# Patient Record
Sex: MALE | Race: WHITE | Employment: FULL TIME | URBAN - NONMETROPOLITAN AREA
[De-identification: names, ages, dates, MRNs, and addresses within clinical notes are randomized per-mention and may not be internally consistent; named-entity substitution may affect disease eponyms.]

---

## 2021-02-19 ENCOUNTER — OFFICE VISIT (OUTPATIENT)
Dept: FAMILY MEDICINE CLINIC | Age: 31
End: 2021-02-19
Payer: COMMERCIAL

## 2021-02-19 VITALS
BODY MASS INDEX: 34.93 KG/M2 | TEMPERATURE: 97.3 F | DIASTOLIC BLOOD PRESSURE: 64 MMHG | SYSTOLIC BLOOD PRESSURE: 128 MMHG | OXYGEN SATURATION: 99 % | WEIGHT: 244 LBS | HEIGHT: 70 IN | HEART RATE: 86 BPM

## 2021-02-19 DIAGNOSIS — Z00.00 WELL ADULT EXAM: Primary | ICD-10-CM

## 2021-02-19 DIAGNOSIS — Z00.00 WELL ADULT EXAM: ICD-10-CM

## 2021-02-19 LAB
BASOPHILS ABSOLUTE: 0.03 E9/L (ref 0–0.2)
BASOPHILS RELATIVE PERCENT: 0.3 % (ref 0–2)
EOSINOPHILS ABSOLUTE: 0.08 E9/L (ref 0.05–0.5)
EOSINOPHILS RELATIVE PERCENT: 0.9 % (ref 0–6)
HCT VFR BLD CALC: 45.5 % (ref 37–54)
HEMOGLOBIN: 14.9 G/DL (ref 12.5–16.5)
IMMATURE GRANULOCYTES #: 0.02 E9/L
IMMATURE GRANULOCYTES %: 0.2 % (ref 0–5)
LYMPHOCYTES ABSOLUTE: 2.32 E9/L (ref 1.5–4)
LYMPHOCYTES RELATIVE PERCENT: 26.4 % (ref 20–42)
MCH RBC QN AUTO: 30.5 PG (ref 26–35)
MCHC RBC AUTO-ENTMCNC: 32.7 % (ref 32–34.5)
MCV RBC AUTO: 93 FL (ref 80–99.9)
MONOCYTES ABSOLUTE: 0.9 E9/L (ref 0.1–0.95)
MONOCYTES RELATIVE PERCENT: 10.2 % (ref 2–12)
NEUTROPHILS ABSOLUTE: 5.45 E9/L (ref 1.8–7.3)
NEUTROPHILS RELATIVE PERCENT: 62 % (ref 43–80)
PDW BLD-RTO: 12.7 FL (ref 11.5–15)
PLATELET # BLD: 293 E9/L (ref 130–450)
PMV BLD AUTO: 10.8 FL (ref 7–12)
RBC # BLD: 4.89 E12/L (ref 3.8–5.8)
WBC # BLD: 8.8 E9/L (ref 4.5–11.5)

## 2021-02-19 PROCEDURE — 99385 PREV VISIT NEW AGE 18-39: CPT | Performed by: FAMILY MEDICINE

## 2021-02-19 SDOH — HEALTH STABILITY: MENTAL HEALTH: HOW OFTEN DO YOU HAVE A DRINK CONTAINING ALCOHOL?: NOT ASKED

## 2021-02-19 ASSESSMENT — PATIENT HEALTH QUESTIONNAIRE - PHQ9
1. LITTLE INTEREST OR PLEASURE IN DOING THINGS: 0
SUM OF ALL RESPONSES TO PHQ QUESTIONS 1-9: 0
SUM OF ALL RESPONSES TO PHQ9 QUESTIONS 1 & 2: 0
2. FEELING DOWN, DEPRESSED OR HOPELESS: 0

## 2021-02-19 NOTE — PROGRESS NOTES
[facility]  Office Progress Note - Dr. Bjorn Biggs  2/19/21    CC:   Chief Complaint   Patient presents with    New Patient    Epistaxis     bloody noses off and on most of life. HPI: establish    No recent PCP    Has had sinus problems and bloody noses consistently throughout his life. Was worse past January with one a day but hasnt been as frequenyt recently. Exam he has some prominent vessels in the right naris, but nothing actively bleeding, nor any dried blood. Maybe worried about sleep apnea. Snores regularly. Wife thinks that maybe a mild apnea type of event. Sometimes has woken choking. Gained some weight, notes he could lose 50 pounds. Wants to work on decreasing sugar. [place] 11  Doesn't want to do a sleep study. Obesity  BMI 35  Has gained probably 50 pounds over time. Never lean in school, but lifting weights a lot. Had a sedentary job for a while and gained some weight. He is motivated to work on this, but worries about dedication. He thinks that trying to improve likely sleep apnea with weight loss is his preferred effort in doing so. Discussed dietary restriction. _________________________________________________________    Assessment / Delene Call was seen today for new patient and epistaxis. Diagnoses and all orders for this visit:    Well adult exam  -     CBC Auto Differential; Future  -     Comprehensive Metabolic Panel; Future  -     Lipid Panel; Future    Overall Lucas Ramirez is doing well. Age appropriate HM topics reviewed and updated where agreeable. Vaccines reviewed and updated where agreeable. Age appropriate anticipatory guidance discussed. Encouraged to work on [employer] and exercise strategies. Opportunity to ask questions and answers provided as able. Spent some time on dietary counseling today and discussed Smart goals for setting and exercise regimen. Encouraged him to work on weight loss. He will follow-up as needed. Discussed that he may have sleep apnea, but this does not sound particularly bothersome. Discussed long-term consequences of untreated sleep apnea. At age 27 without any red flag symptoms, I think he can continue to work on weight loss as a cure for this problem. If he has increased fatigue during the day or develops any new or unexplained symptoms, he will follow-up. [place] score 11. Recommend RTO in 1 year for annual physical.       Return in about 1 year (around 2/19/2022). or as scheduled. Patient counseled to follow up sooner or seek more acute care if symptoms worsening or not improving according to plan. Electronically signed by Catherin Lefort, MD on 2/19/2021    _________________________________________________________  No current outpatient medications on file prior to visit. No current facility-administered medications on file prior to visit. There is no problem list on file for this patient.    _________________________________________________________  Past Medical History:   Diagnosis Date    Depression     Was on antidepression meds at 12        Family History   Problem Relation Age of Onset    Depression Mother     Cancer Father         Lung    High Blood Pressure Maternal Grandfather     Heart Attack Maternal Grandfather     Other Maternal Grandmother         Brain aneurysm    Cancer Paternal Grandfather        History reviewed. No pertinent surgical history. 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 Went to [employer] in [place]. Started some woodworking recently. 2 children - 4 and 2 yo. Chart reviewed and updated where appropriate for PMH, Fam, and Soc Hx. _________________________________________________________  ROS: POSITIVE: As in the HPI. Otherwise Pertinent negatives are negative.    __________________________________________________________  Physical Exam   /64 (Site: Left Upper Arm, Position: Sitting, Cuff Size: Large Adult)   Pulse 86   Temp 97.3 °F (36.3 °C) (Temporal)   Ht 5' 10\" (1.778 m)   Wt 244 lb (110.7 kg)   SpO2 99%   BMI 35.01 kg/m²   Wt Readings from Last 3 Encounters:   02/19/21 244 lb (110.7 kg)   06/19/16 230 lb (104.3 kg)       Constitutional:    He is oriented to person, place, and time. He appears well-developed and well-nourished. HENT:    Right Ear: normal pinna, normal canal, normal TM. Left Ear: normal pinna, normal canal, normal TM. Nose: Nose normal.  Prominent vessels in the right naris   Mouth/Throat: Oropharynx is clear and moist.  Mildly crowded oropharynx  Eyes:    Conjunctivae are normal.    Pupils are equal, round, and reactive to light. EOMI. Neck:    Normal range of motion. No thyromegaly or nodules noted. No bruit. Cardiovascular:    Normal rate, regular rhythm and normal heart sounds. No murmur. No gallop and no friction rub. Pulmonary/Chest:    Effort normal and breath sounds normal.    No wheezes. No rales or rhonchi. Abdominal:    Soft. Bowel sounds are normal.    No distension. No tenderness. Musculoskeletal:    Normal range of motion. No joint swelling noted. No peripheral edema. Skin:    Skin is warm and dry. No rashes, lesions. Psychiatric:    He has a normal mood and affect. Normal groom and dress. No SI or HI.   ________________________________________________________    This note may have been created using dictation software.  Efforts were made to reduce grammatical or syntax errors, but some may persist.

## 2021-02-20 LAB
ALBUMIN SERPL-MCNC: 4.7 G/DL (ref 3.5–5.2)
ALP BLD-CCNC: 83 U/L (ref 40–129)
ALT SERPL-CCNC: 24 U/L (ref 0–40)
ANION GAP SERPL CALCULATED.3IONS-SCNC: 15 MMOL/L (ref 7–16)
AST SERPL-CCNC: 21 U/L (ref 0–39)
BILIRUB SERPL-MCNC: 0.4 MG/DL (ref 0–1.2)
BUN BLDV-MCNC: 12 MG/DL (ref 6–20)
CALCIUM SERPL-MCNC: 9.8 MG/DL (ref 8.6–10.2)
CHLORIDE BLD-SCNC: 106 MMOL/L (ref 98–107)
CHOLESTEROL, TOTAL: 134 MG/DL (ref 0–199)
CO2: 22 MMOL/L (ref 22–29)
CREAT SERPL-MCNC: 1 MG/DL (ref 0.7–1.2)
GFR AFRICAN AMERICAN: >60
GFR NON-AFRICAN AMERICAN: >60 ML/MIN/1.73
GLUCOSE BLD-MCNC: 94 MG/DL (ref 74–99)
HDLC SERPL-MCNC: 48 MG/DL
LDL CHOLESTEROL CALCULATED: 65 MG/DL (ref 0–99)
POTASSIUM SERPL-SCNC: 4.6 MMOL/L (ref 3.5–5)
SODIUM BLD-SCNC: 143 MMOL/L (ref 132–146)
TOTAL PROTEIN: 7.6 G/DL (ref 6.4–8.3)
TRIGL SERPL-MCNC: 107 MG/DL (ref 0–149)
VLDLC SERPL CALC-MCNC: 21 MG/DL

## 2022-02-22 ENCOUNTER — TELEPHONE (OUTPATIENT)
Dept: FAMILY MEDICINE CLINIC | Age: 32
End: 2022-02-22

## 2022-02-22 ENCOUNTER — OFFICE VISIT (OUTPATIENT)
Dept: FAMILY MEDICINE CLINIC | Age: 32
End: 2022-02-22
Payer: COMMERCIAL

## 2022-02-22 VITALS
DIASTOLIC BLOOD PRESSURE: 72 MMHG | WEIGHT: 231.2 LBS | OXYGEN SATURATION: 98 % | TEMPERATURE: 98.1 F | HEART RATE: 79 BPM | HEIGHT: 70 IN | BODY MASS INDEX: 33.1 KG/M2 | SYSTOLIC BLOOD PRESSURE: 130 MMHG | RESPIRATION RATE: 16 BRPM

## 2022-02-22 DIAGNOSIS — Z82.49 FAMILY HISTORY OF BRAIN ANEURYSM: ICD-10-CM

## 2022-02-22 DIAGNOSIS — Z00.00 ENCOUNTER FOR WELL ADULT EXAM WITHOUT ABNORMAL FINDINGS: Primary | ICD-10-CM

## 2022-02-22 PROCEDURE — 99395 PREV VISIT EST AGE 18-39: CPT | Performed by: FAMILY MEDICINE

## 2022-02-22 SDOH — ECONOMIC STABILITY: FOOD INSECURITY: WITHIN THE PAST 12 MONTHS, YOU WORRIED THAT YOUR FOOD WOULD RUN OUT BEFORE YOU GOT MONEY TO BUY MORE.: NEVER TRUE

## 2022-02-22 SDOH — ECONOMIC STABILITY: FOOD INSECURITY: WITHIN THE PAST 12 MONTHS, THE FOOD YOU BOUGHT JUST DIDN'T LAST AND YOU DIDN'T HAVE MONEY TO GET MORE.: NEVER TRUE

## 2022-02-22 ASSESSMENT — PATIENT HEALTH QUESTIONNAIRE - PHQ9
SUM OF ALL RESPONSES TO PHQ9 QUESTIONS 1 & 2: 0
SUM OF ALL RESPONSES TO PHQ QUESTIONS 1-9: 0
SUM OF ALL RESPONSES TO PHQ QUESTIONS 1-9: 0
1. LITTLE INTEREST OR PLEASURE IN DOING THINGS: 0
SUM OF ALL RESPONSES TO PHQ QUESTIONS 1-9: 0
SUM OF ALL RESPONSES TO PHQ QUESTIONS 1-9: 0
2. FEELING DOWN, DEPRESSED OR HOPELESS: 0

## 2022-02-22 ASSESSMENT — SOCIAL DETERMINANTS OF HEALTH (SDOH): HOW HARD IS IT FOR YOU TO PAY FOR THE VERY BASICS LIKE FOOD, HOUSING, MEDICAL CARE, AND HEATING?: NOT VERY HARD

## 2022-02-22 NOTE — PROGRESS NOTES
[facility]  Office Progress Note - Dr. Ora Ferrera  22    CC:   Chief Complaint   Patient presents with    Annual Exam        /72   Pulse 79   Temp 98.1 °F (36.7 °C) (Temporal)   Resp 16   Ht 5' 10\" (1.778 m)   Wt 231 lb 3.2 oz (104.9 kg)   SpO2 98%   BMI 33.17 kg/m²   Wt Readings from Last 3 Encounters:   22 231 lb 3.2 oz (104.9 kg)   21 244 lb (110.7 kg)   16 230 lb (104.3 kg)       HPI:   Patient presents for yearly physical.     Overall they are doing well. Concerns: mother with brain aneurysm recently. Coiled. Notes that her mother also had a brain aneurysm and  of this in her late 45s. He has rare headaches, mild, maybe a few times a month. A couple episodes of migraine in his lifetime. Excedrin migraine generally helps. No vision changes. Reviewing recommendations on screening people with one first-degree relative, the authors do not recommend screening in this population as the benefits do not appear to outweigh significant risks and about 150 people need screenings to prevent 1 subarachnoid hemorrhage in a lifetime. \"My mental tracy isnt where I want it to be\"  Dx with depression as a teenager. Was on medicine for a while stopped it and didn't notice anything different  Added stress recently from moms surgery and also bought a house last summer. [place] fuse. He had an episode around [place] time where he got really frustrated while in the car with his family. His kids were screaming and throwing a tantrum. Ultimately he felt like he had to walk home from the store and could not stay in the car any longer. Reflecting on this after heightened emotions past, he was pretty embarrassed and embarrassed for his wife as well. He also does not want his kids to remember this when they are older (they're pretty little now).   He does not really want to start a medication and he does not feel that irritability is affecting his work or many of his relationships. Discussed medication versus counseling versus being self reflective in those moments. Weight reviewed. Body mass index is 33.17 kg/m². Wt Readings from Last 3 Encounters:   02/22/22 231 lb 3.2 oz (104.9 kg)   02/19/21 244 lb (110.7 kg)   06/19/16 230 lb (104.3 kg)   hasnt beenworking on it strongly. Notes snacking has decreased - not as hungry as he used to be. Patient is  currently working on diet. Patient is not currently working on exercise. Job is strenuous. Vaccines reviewed. Discussed age appropriate vaccine recommendations. HM Reviewed. Discussed age appropriate HM topics. Patient was encouraged to update HM topics. Ordered where agreeable. Reviewed age appropriate anticipatory guidance. Recent labs reviewed include: Last year's labs were perfect. The ASCVD Risk score (Al Handley., et al., 2013) failed to calculate for the following reasons: The 2013 ASCVD risk score is only valid for ages 36 to 78    PMH, FH, SxHx, Social Hx reviewed and updated if needed. _________________________________________________________    Assessment / Mena Solano was seen today for annual exam.    Diagnoses and all orders for this visit:    Encounter for well adult exam without abnormal findings  Overall Isai Sy is doing well. Age appropriate HM topics reviewed and updated where agreeable. Vaccines reviewed and updated where agreeable. Age appropriate anticipatory guidance discussed. Encouraged to work on [employer] and exercise strategies. Opportunity to ask questions and answers provided as able. Mental health was discussed today. Ultimately he feels that this is occasionally bothersome but not regularly bothersome enough to warrant him taking the medication at this time. I tend to agree. I encouraged a self reflective process to try to more regularly identify times when he is becoming irritable and start to change that behavior.   I also offered him counseling as an option, but he is not interested at this time. He will let me know if he decides to try medicine in the future. Recommend RTO in 1 year for annual physical.     Family history of brain aneurysm  Reviewing up-to-date recommendations, I do not recommend screening for aneurysm at this time especially given asymptomatic nature in this patient. 149 first-degree relatives would need screened to prevent 1 subarachnoid hemorrhage in their lifetime nearly 300 first-degree relatives would need to be screened to prevent 1 fatal subarachnoid hemorrhage in their lifetime. Patient was counseled to let me know if he develops any change in headache pattern. Return in about 1 year (around 2/22/2023). or as scheduled. Patient counseled to follow up sooner or seek more acute care if symptoms worsening or not improving according to plan. Electronically signed by Fatmata Fontana MD on 2/22/2022    _________________________________________________________  No current outpatient medications on file prior to visit. No current facility-administered medications on file prior to visit. Patient Active Problem List   Diagnosis Code    Family history of brain aneurysm Z82.49     _________________________________________________________  Past Medical History:   Diagnosis Date    Depression     Was on antidepression meds at 12        Family History   Problem Relation Age of Onset    Depression Mother     Cerebral Aneurysm Mother     Cancer Father         Lung    Other Maternal Grandmother         Brain aneurysm    High Blood Pressure Maternal Grandfather     Heart Attack Maternal Grandfather     Cancer Paternal Grandfather        No past surgical history on file. Social History     Tobacco Use    Smoking status: Never Smoker    Smokeless tobacco: Never Used   Substance Use Topics    Alcohol use:  Yes    Drug use: Never       Chart reviewed and updated where appropriate for PMH, Fam, and Soc Hx.  _________________________________________________________  ROS: POSITIVE: As in the HPI. Otherwise Pertinent negatives are negative.    __________________________________________________________  Physical Exam   Constitutional:    He is oriented to person, place, and time. He appears well-developed and well-nourished. HENT:    Right Ear: normal pinna, normal canal, normal TM. Left Ear: normal pinna, normal canal, normal TM. Nose: Nose normal.    Mouth/Throat: Oropharynx is clear and moist.   Eyes:    Conjunctivae are normal.    Pupils are equal, round, and reactive to light. EOMI. Neck:    Normal range of motion. No thyromegaly or nodules noted. No bruit. No LAD. Cardiovascular:    Normal rate, regular rhythm and normal heart sounds. No murmur. No gallop and no friction rub. Pulmonary/Chest:    Effort normal and breath sounds normal.    No wheezes. No rales or rhonchi. Abdominal:    Soft. Bowel sounds are normal.    No distension. No tenderness. Musculoskeletal:    Normal range of motion. No joint swelling noted. No peripheral edema. Neurological:    He is A&Ox3. Motor and sensation grossly intact. Normal Gait. Skin:    Skin is warm and dry. No rashes, lesions. Psychiatric:    He has a normal mood and affect. Normal groom and dress. No SI or HI.   ________________________________________________________    This note may have been created using dictation software.  Efforts were made to reduce errors, but some may persist.

## 2022-02-22 NOTE — TELEPHONE ENCOUNTER
Please advise patient that if he does not want to screen for a brain aneurysm, that is reasonable. With just one first-degree relative with a brain aneurysm (his mother), he has about a 4% chance of having one himself. Not all aneurysms burst/bleed. He would have about a 0.6% chance of having a brain bleed in his lifetime. Not all brain bleeds kill you. He would have about a 0.3% chance of having a deadly brain bleed in his lifetime. So ultimately the risks are low. If he wants to screen to be sure, that is still something we could pursue.

## 2022-02-25 NOTE — TELEPHONE ENCOUNTER
Left VM advising pt that I was going to send Doctor's message to him via [employer]. Advsied him to call us if he is unable to get into his mychart to review the message.

## 2024-11-28 ASSESSMENT — PATIENT HEALTH QUESTIONNAIRE - PHQ9
1. LITTLE INTEREST OR PLEASURE IN DOING THINGS: NOT AT ALL
1. LITTLE INTEREST OR PLEASURE IN DOING THINGS: NOT AT ALL
SUM OF ALL RESPONSES TO PHQ9 QUESTIONS 1 & 2: 0
SUM OF ALL RESPONSES TO PHQ QUESTIONS 1-9: 0
2. FEELING DOWN, DEPRESSED OR HOPELESS: NOT AT ALL
SUM OF ALL RESPONSES TO PHQ QUESTIONS 1-9: 0
SUM OF ALL RESPONSES TO PHQ QUESTIONS 1-9: 0
2. FEELING DOWN, DEPRESSED OR HOPELESS: NOT AT ALL
SUM OF ALL RESPONSES TO PHQ QUESTIONS 1-9: 0
SUM OF ALL RESPONSES TO PHQ9 QUESTIONS 1 & 2: 0

## 2024-11-29 ENCOUNTER — OFFICE VISIT (OUTPATIENT)
Dept: FAMILY MEDICINE CLINIC | Age: 34
End: 2024-11-29
Payer: COMMERCIAL

## 2024-11-29 VITALS
DIASTOLIC BLOOD PRESSURE: 86 MMHG | RESPIRATION RATE: 16 BRPM | WEIGHT: 246 LBS | HEART RATE: 88 BPM | SYSTOLIC BLOOD PRESSURE: 128 MMHG | HEIGHT: 70 IN | OXYGEN SATURATION: 98 % | BODY MASS INDEX: 35.22 KG/M2 | TEMPERATURE: 98.8 F

## 2024-11-29 DIAGNOSIS — Z00.00 ENCOUNTER FOR WELL ADULT EXAM WITHOUT ABNORMAL FINDINGS: Primary | ICD-10-CM

## 2024-11-29 DIAGNOSIS — Z82.49 FAMILY HISTORY OF BRAIN ANEURYSM: ICD-10-CM

## 2024-11-29 DIAGNOSIS — Z00.00 ENCOUNTER FOR WELL ADULT EXAM WITHOUT ABNORMAL FINDINGS: ICD-10-CM

## 2024-11-29 PROCEDURE — 99395 PREV VISIT EST AGE 18-39: CPT | Performed by: FAMILY MEDICINE

## 2024-11-29 SDOH — ECONOMIC STABILITY: FOOD INSECURITY: WITHIN THE PAST 12 MONTHS, YOU WORRIED THAT YOUR FOOD WOULD RUN OUT BEFORE YOU GOT MONEY TO BUY MORE.: NEVER TRUE

## 2024-11-29 SDOH — ECONOMIC STABILITY: FOOD INSECURITY: WITHIN THE PAST 12 MONTHS, THE FOOD YOU BOUGHT JUST DIDN'T LAST AND YOU DIDN'T HAVE MONEY TO GET MORE.: NEVER TRUE

## 2024-11-29 SDOH — ECONOMIC STABILITY: INCOME INSECURITY: HOW HARD IS IT FOR YOU TO PAY FOR THE VERY BASICS LIKE FOOD, HOUSING, MEDICAL CARE, AND HEATING?: NOT HARD AT ALL

## 2024-11-29 NOTE — PROGRESS NOTES
[facility]  [facility] Note - Dr. Skelton  11/29/24    CC:   Chief Complaint   Patient presents with    Annual Exam        /86   Pulse 88   Temp 98.8 °F (37.1 °C) (Temporal)   Resp 16   Ht 1.778 m (5' 10\")   Wt 111.6 kg (246 lb)   SpO2 98%   BMI 35.30 kg/m²   Wt Readings from Last 3 Encounters:   11/29/24 111.6 kg (246 lb)   02/22/22 104.9 kg (231 lb 3.2 oz)   02/19/21 110.7 kg (244 lb)       HPI: Patient presents for yearly physical.     Overall they are doing well.    Concerns: Feeling well.   Just a check up.     Headaches have improved.  Pretty rare now.  He thinks maybe due to lack of sleep before, which has now changed for the better. Usually in the neck when he gets one.       Weight reviewed. Body mass index is 35.3 kg/m².  Wt Readings from Last 3 Encounters:   11/29/24 111.6 kg (246 lb)   02/22/22 104.9 kg (231 lb 3.2 oz)   02/19/21 110.7 kg (244 lb)     Patient is  currently working on diet.  Patient is not currently working on exercise.   He lost probably about 15 pounds or so since adjusting diet in the Spring.     Vaccines reviewed.   Discussed age appropriate vaccine recommendations.     HM Reviewed.   Discussed age appropriate HM topics.   Patient was encouraged to update HM topics. Ordered where agreeable.     Reviewed age appropriate anticipatory guidance.     Recent labs reviewed include: none.  Last drawn 2021.     The ASCVD Risk score (Kandice DK, et al., 2019) failed to calculate for the following reasons:    The 2019 ASCVD risk score is only valid for ages 40 to 79    PMH, FH, SxHx, Social Hx reviewed and updated if needed.   Changed jobs, but sleeping better. 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        _________________________________________________________    Assessment / Plan  Alexandre was seen today for annual exam.    Diagnoses and all orders for this visit:    Encounter for well adult exam without abnormal findings  -     CBC with Auto Differential;

## 2024-11-30 LAB
ALBUMIN: 4.3 G/DL (ref 3.5–5.2)
ALP BLD-CCNC: 70 U/L (ref 40–129)
ALT SERPL-CCNC: 22 U/L (ref 0–40)
ANION GAP SERPL CALCULATED.3IONS-SCNC: 11 MMOL/L (ref 7–16)
AST SERPL-CCNC: 18 U/L (ref 0–39)
BASOPHILS ABSOLUTE: 0.03 K/UL (ref 0–0.2)
BASOPHILS RELATIVE PERCENT: 0 % (ref 0–2)
BILIRUB SERPL-MCNC: 0.3 MG/DL (ref 0–1.2)
BUN BLDV-MCNC: 11 MG/DL (ref 6–20)
CALCIUM SERPL-MCNC: 9.1 MG/DL (ref 8.6–10.2)
CHLORIDE BLD-SCNC: 104 MMOL/L (ref 98–107)
CHOLESTEROL, TOTAL: 145 MG/DL
CO2: 25 MMOL/L (ref 22–29)
CREAT SERPL-MCNC: 0.9 MG/DL (ref 0.7–1.2)
EOSINOPHILS ABSOLUTE: 0.14 K/UL (ref 0.05–0.5)
EOSINOPHILS RELATIVE PERCENT: 2 % (ref 0–6)
GFR, ESTIMATED: >90 ML/MIN/1.73M2
GLUCOSE BLD-MCNC: 94 MG/DL (ref 74–99)
HCT VFR BLD CALC: 47.4 % (ref 37–54)
HDLC SERPL-MCNC: 48 MG/DL
HEMOGLOBIN: 15.2 G/DL (ref 12.5–16.5)
IMMATURE GRANULOCYTES %: 0 % (ref 0–5)
IMMATURE GRANULOCYTES ABSOLUTE: 0.03 K/UL (ref 0–0.58)
LDL CHOLESTEROL: 82 MG/DL
LYMPHOCYTES ABSOLUTE: 2.08 K/UL (ref 1.5–4)
LYMPHOCYTES RELATIVE PERCENT: 31 % (ref 20–42)
MCH RBC QN AUTO: 30 PG (ref 26–35)
MCHC RBC AUTO-ENTMCNC: 32.1 G/DL (ref 32–34.5)
MCV RBC AUTO: 93.5 FL (ref 80–99.9)
MONOCYTES ABSOLUTE: 0.6 K/UL (ref 0.1–0.95)
MONOCYTES RELATIVE PERCENT: 9 % (ref 2–12)
NEUTROPHILS ABSOLUTE: 3.87 K/UL (ref 1.8–7.3)
NEUTROPHILS RELATIVE PERCENT: 57 % (ref 43–80)
PDW BLD-RTO: 13 % (ref 11.5–15)
PLATELET # BLD: 318 K/UL (ref 130–450)
PMV BLD AUTO: 10.6 FL (ref 7–12)
POTASSIUM SERPL-SCNC: 4.4 MMOL/L (ref 3.5–5)
RBC # BLD: 5.07 M/UL (ref 3.8–5.8)
SODIUM BLD-SCNC: 140 MMOL/L (ref 132–146)
TOTAL PROTEIN: 7.3 G/DL (ref 6.4–8.3)
TRIGL SERPL-MCNC: 77 MG/DL
VLDLC SERPL CALC-MCNC: 15 MG/DL
WBC # BLD: 6.8 K/UL (ref 4.5–11.5)